# Patient Record
Sex: MALE | Race: WHITE | URBAN - NONMETROPOLITAN AREA
[De-identification: names, ages, dates, MRNs, and addresses within clinical notes are randomized per-mention and may not be internally consistent; named-entity substitution may affect disease eponyms.]

---

## 2021-09-17 ENCOUNTER — HOSPITAL ENCOUNTER (EMERGENCY)
Age: 26
Discharge: HOME | End: 2021-09-17
Attending: EMERGENCY MEDICINE

## 2021-09-17 VITALS
BODY MASS INDEX: 19.7 KG/M2 | HEART RATE: 98 BPM | DIASTOLIC BLOOD PRESSURE: 82 MMHG | HEIGHT: 68 IN | RESPIRATION RATE: 18 BRPM | TEMPERATURE: 99 F | WEIGHT: 130 LBS | OXYGEN SATURATION: 99 % | SYSTOLIC BLOOD PRESSURE: 138 MMHG

## 2021-09-17 DIAGNOSIS — K04.7 DENTAL ABSCESS: Primary | ICD-10-CM

## 2021-09-17 PROCEDURE — 6370000000 HC RX 637 (ALT 250 FOR IP): Performed by: EMERGENCY MEDICINE

## 2021-09-17 PROCEDURE — 99284 EMERGENCY DEPT VISIT MOD MDM: CPT

## 2021-09-17 RX ORDER — PENICILLIN V POTASSIUM 500 MG/1
500 TABLET ORAL 4 TIMES DAILY
Qty: 40 TABLET | Refills: 0 | Status: SHIPPED | OUTPATIENT
Start: 2021-09-17 | End: 2021-09-27

## 2021-09-17 RX ORDER — PENICILLIN V POTASSIUM 250 MG/1
500 TABLET ORAL ONCE
Status: COMPLETED | OUTPATIENT
Start: 2021-09-17 | End: 2021-09-17

## 2021-09-17 RX ADMIN — PENICILLIN V POTASSIUM 500 MG: 250 TABLET, FILM COATED ORAL at 14:27

## 2021-09-17 NOTE — ED NOTES
Discharge instructions and medications reviewed with patient. Patient verbalized understanding of medications and follow up. All questions answered at this time Skin warm, pink, and dry. Patient alert and oriented x4. Pt ambulated to lobby with a stable gait. Patient discharged home with 1 prescriptions.       Crys Nova RN  09/17/21 2174

## 2021-09-17 NOTE — ED PROVIDER NOTES
[facility]      Pt Name: Nazia Anderson  MRN: [id number]  [place] 1995  Date of evaluation: 9/17/2021  Provider: Sofiya Wray MD    [address] [place] [address]       Chief Complaint   Patient presents with    Facial Swelling     right cheek, x1 week          HISTORY OF PRESENT ILLNESS   (Location/Symptom, Timing/Onset, Context/Setting, Quality, Duration, Modifying Factors, Severity)  Note limiting factors. Nazia Anderson is a 32 y.o. male with past medical history of poor dentition with prior dental abscesses here today for concern of the same. Patient states for the last week he has developed slowly worsening swelling to the right upper jaw and cheek as a result of an abscessed tooth. He states it has been spontaneously draining. He had low-grade fevers at home. Notes a throbbing aching discomfort associated with the swelling. No obvious alleviating factors. [facility]    Nursing Notes were reviewed. REVIEW OF SYSTEMS    (2-9 systems for level 4, 10 or more for level 5)     Review of Systems    Please see HPI for pertinent positive and negative review of system findings. A full 10 system ROS was performed and otherwise negative. PAST MEDICAL HISTORY     Past Medical History:   Diagnosis Date    MRSA (methicillin resistant staph aureus) culture positive 12/27/12    abscess buttock         SURGICAL HISTORY     History reviewed. No pertinent surgical history. CURRENT MEDICATIONS       Previous Medications    No medications on file       ALLERGIES     Patient has no known allergies. FAMILY HISTORY     History reviewed. No pertinent family history.        SOCIAL HISTORY       Social History     Socioeconomic History    Marital status: Single     Spouse name: None    Number of children: None    Years of education: None    Highest education level: None   Occupational History    None   Tobacco Use    Smoking status: Current Every Day Smoker Packs/day: 1.00     Types: Cigarettes    Smokeless tobacco: Never Used   Vaping Use    Vaping Use: Never used   Substance and Sexual Activity    Alcohol use: No    Drug use: No    Sexual activity: Yes     Partners: Female   Other Topics Concern    None   Social History Narrative    None     Social Determinants of Health     Financial Resource Strain:     Difficulty of Paying Living Expenses:    Food Insecurity:     Worried About Running Out of Food in the Last Year:     Ran Out of Food in the Last Year:    Transportation Needs:     Lack of Transportation (Medical):  Lack of Transportation (Non-Medical):    Physical Activity:     Days of Exercise per Week:     Minutes of Exercise per Session:    Stress:     Feeling of Stress :    Social Connections:     Frequency of Communication with Friends and Family:     Frequency of Social Gatherings with Friends and Family:     Attends Taoism Services:     Active Member of Clubs or Organizations:     Attends Club or Organization Meetings:     Marital Status:    Intimate Partner Violence:     Fear of Current or Ex-Partner:     Emotionally Abused:     Physically Abused:     Sexually Abused:        SCREENINGS               PHYSICAL EXAM    (up to 7 for level 4, 8 or more for level 5)     ED Triage Vitals [09/17/21 1404]   BP Temp Temp Source Pulse Resp SpO2 Height Weight   (!) 144/106 99 °F (37.2 °C) Oral 111 16 99 % 5' 8\" (1.727 m) 130 lb (59 kg)       Physical Exam      General appearance:  Cooperative. No acute distress. Skin:  Warm. Dry. Ears, nose, mouth and throat:  Oral mucosa moist, soft tissue swelling and mild fullness in the right upper jaw. Oral evaluation notes heavily diseased teeth with poor dentition and erosion of tooth #3, 4 5 where there is associated gum edema and erythema and spontaneously draining purulence. Tongue and uvular midline. Floor of mouth is soft. No involvement of the eye.   No trismus  Perfusion:

## 2022-09-13 ENCOUNTER — HOSPITAL ENCOUNTER (EMERGENCY)
Age: 27
Discharge: HOME | End: 2022-09-13
Attending: STUDENT IN AN ORGANIZED HEALTH CARE EDUCATION/TRAINING PROGRAM

## 2022-09-13 ENCOUNTER — APPOINTMENT (OUTPATIENT)
Dept: CT IMAGING | Age: 27
End: 2022-09-13

## 2022-09-13 VITALS
BODY MASS INDEX: 21.22 KG/M2 | WEIGHT: 140 LBS | DIASTOLIC BLOOD PRESSURE: 88 MMHG | OXYGEN SATURATION: 100 % | SYSTOLIC BLOOD PRESSURE: 139 MMHG | TEMPERATURE: 97.6 F | RESPIRATION RATE: 20 BRPM | HEIGHT: 68 IN | HEART RATE: 84 BPM

## 2022-09-13 DIAGNOSIS — K02.9 DENTAL CARIES: ICD-10-CM

## 2022-09-13 DIAGNOSIS — J02.9 SORE THROAT: Primary | ICD-10-CM

## 2022-09-13 LAB
ANION GAP SERPL CALCULATED.3IONS-SCNC: 9 MMOL/L (ref 3–16)
BASOPHILS ABSOLUTE: 0 K/UL (ref 0–0.2)
BASOPHILS RELATIVE PERCENT: 0.7 %
BUN BLDV-MCNC: 10 MG/DL (ref 7–20)
CALCIUM SERPL-MCNC: 9 MG/DL (ref 8.3–10.6)
CHLORIDE BLD-SCNC: 102 MMOL/L (ref 99–110)
CO2: 24 MMOL/L (ref 21–32)
CREAT SERPL-MCNC: 0.6 MG/DL (ref 0.9–1.3)
EOSINOPHILS ABSOLUTE: 0.2 K/UL (ref 0–0.6)
EOSINOPHILS RELATIVE PERCENT: 3.4 %
GFR AFRICAN AMERICAN: >60
GFR NON-AFRICAN AMERICAN: >60
GLUCOSE BLD-MCNC: 97 MG/DL (ref 70–99)
HCT VFR BLD CALC: 39.4 % (ref 40.5–52.5)
HEMOGLOBIN: 13.1 G/DL (ref 13.5–17.5)
LYMPHOCYTES ABSOLUTE: 2.1 K/UL (ref 1–5.1)
LYMPHOCYTES RELATIVE PERCENT: 30.9 %
MCH RBC QN AUTO: 28.8 PG (ref 26–34)
MCHC RBC AUTO-ENTMCNC: 33.2 G/DL (ref 31–36)
MCV RBC AUTO: 86.9 FL (ref 80–100)
MONOCYTES ABSOLUTE: 0.7 K/UL (ref 0–1.3)
MONOCYTES RELATIVE PERCENT: 9.5 %
NEUTROPHILS ABSOLUTE: 3.8 K/UL (ref 1.7–7.7)
NEUTROPHILS RELATIVE PERCENT: 55.5 %
PDW BLD-RTO: 12.8 % (ref 12.4–15.4)
PLATELET # BLD: 238 K/UL (ref 135–450)
PMV BLD AUTO: 9.7 FL (ref 5–10.5)
POTASSIUM REFLEX MAGNESIUM: 4.3 MMOL/L (ref 3.5–5.1)
RBC # BLD: 4.53 M/UL (ref 4.2–5.9)
S PYO AG THROAT QL: NEGATIVE
SODIUM BLD-SCNC: 135 MMOL/L (ref 136–145)
WBC # BLD: 6.9 K/UL (ref 4–11)

## 2022-09-13 PROCEDURE — 70491 CT SOFT TISSUE NECK W/DYE: CPT

## 2022-09-13 PROCEDURE — 87081 CULTURE SCREEN ONLY: CPT

## 2022-09-13 PROCEDURE — 87077 CULTURE AEROBIC IDENTIFY: CPT

## 2022-09-13 PROCEDURE — 6360000004 HC RX CONTRAST MEDICATION: Performed by: STUDENT IN AN ORGANIZED HEALTH CARE EDUCATION/TRAINING PROGRAM

## 2022-09-13 PROCEDURE — 87880 STREP A ASSAY W/OPTIC: CPT

## 2022-09-13 PROCEDURE — 80048 BASIC METABOLIC PNL TOTAL CA: CPT

## 2022-09-13 PROCEDURE — 99285 EMERGENCY DEPT VISIT HI MDM: CPT

## 2022-09-13 PROCEDURE — 85025 COMPLETE CBC W/AUTO DIFF WBC: CPT

## 2022-09-13 RX ORDER — CHLORHEXIDINE GLUCONATE 0.12 MG/ML
15 RINSE ORAL 3 TIMES DAILY
Qty: 630 ML | Refills: 0 | Status: SHIPPED | OUTPATIENT
Start: 2022-09-13 | End: 2022-09-27

## 2022-09-13 RX ORDER — CLINDAMYCIN HYDROCHLORIDE 300 MG/1
300 CAPSULE ORAL 4 TIMES DAILY
Qty: 28 CAPSULE | Refills: 0 | Status: SHIPPED | OUTPATIENT
Start: 2022-09-13 | End: 2022-09-20

## 2022-09-13 RX ORDER — NAPROXEN 500 MG/1
500 TABLET ORAL 2 TIMES DAILY PRN
Qty: 20 TABLET | Refills: 0 | Status: SHIPPED | OUTPATIENT
Start: 2022-09-13 | End: 2022-09-23

## 2022-09-13 RX ORDER — LIDOCAINE HYDROCHLORIDE 20 MG/ML
15 SOLUTION OROPHARYNGEAL EVERY 4 HOURS PRN
Qty: 100 ML | Refills: 0 | Status: SHIPPED | OUTPATIENT
Start: 2022-09-13 | End: 2022-09-18

## 2022-09-13 RX ADMIN — IOPAMIDOL 75 ML: 755 INJECTION, SOLUTION INTRAVENOUS at 14:54

## 2022-09-13 ASSESSMENT — PAIN SCALES - GENERAL
PAINLEVEL_OUTOF10: 6
PAINLEVEL_OUTOF10: 6

## 2022-09-13 ASSESSMENT — PAIN - FUNCTIONAL ASSESSMENT
PAIN_FUNCTIONAL_ASSESSMENT: 0-10
PAIN_FUNCTIONAL_ASSESSMENT: 0-10

## 2022-09-13 NOTE — ED NOTES
Patient to nurse's station asking for IV to be removed and wanting to leave ER now states \"I am not waiting any longer\" Writer informed patient need to stay for CT neck results and if anything comes abnormal patient will not know if they leave. Patient verbalized understanding. Writer removed patient's IV and gave patient AVS written by Dr. Sarwat Huggins. Discharge instructions of medications and follow-up with PCP and dentist clinics were reviewed with the patient and the patient verbalized understanding. Patient IV was removed with no complications. Patient stable, GCS 15, and ambulatory upon discharge.      Robert Iglesias RN  09/13/22 5748

## 2022-09-13 NOTE — DISCHARGE INSTRUCTIONS
Dental Emergency Referrals    [address] [facility] residents only)    [facility] [place]  [address] [phone]   [facility].  [phone]   [address] [facility]  [address] [facility] [address]  [phone]   [facility] [place]  (entrance on [address] [facility]. [address] [place] [address].)  [address]  [phone]   [facility] 167.  [phone]   [facility]  [address] [place] [address].  [phone] [address] [facility]  [address] [facility].  [phone]   [facility]  [address]  [phone]     [facility] [[email or web address]] MEDICAL [place]  [facility]. 2nd floor  [phone]   [facility]  [address] [phone] ([address] [facility])  [place]: [address] [facility] [address]: [address]  [phone]   [address] [facility]/ [address]  [phone]     [facility]  [address] [facility] [address]  [phone]   [address]  [address] [facility].  Oral Surgery Dept: [phone]  Dental Clinic: [address] [facility]  [phone]     [address] [phone]   Urgent [facility] Care   [address] [place], [address]  [place] : [address] : [phone]     [facility]  [address]    Other [address] in the area    [address] [facility] (Dental Urgent Care)  [facility]  (Across from [place])  [facility], [address]  [phone]?       Pediatric Only Dentists    [address]Third Floor   Up to age 21  [address]  Up to age Carley 74: [facility] [place] on [address]   [phone] or [address] [facility] [address]: [address] [place] [address]  Up to age 14  [address] [phone]

## 2022-09-13 NOTE — ED PROVIDER NOTES
[address] [place] [address]  ED      CHIEF COMPLAINT  Sore throat       HISTORY OF PRESENT ILLNESS  Wild Joy is a 32 y.o. male  who presents to the ED complaining of sore throat. Onset: 3-4d, no inciting event  Quality: aching  Severity: 6/10, constant  Palliative Factors: denies  Provocative Factors: denies  Pain in right teeth, reports pain radiates to his right ear  Teeth sensitivity    Patient is tolerating secretions. They deny muffled voice. They deny difficulty breathing or swallowing. Old records reviewed:  Patient has been seen before for dental abscess    No other complaints, modifying factors or associated symptoms. I have reviewed the following from the nursing documentation. Past Medical History:   Diagnosis Date    MRSA (methicillin resistant staph aureus) culture positive 12/27/12    abscess buttock     History reviewed. No pertinent surgical history. History reviewed. No pertinent family history. Social History     Socioeconomic History    Marital status: Single     Spouse name: Not on file    Number of children: Not on file    Years of education: Not on file    Highest education level: Not on file   Occupational History    Not on file   Tobacco Use    Smoking status: Every Day     Packs/day: 1.00     Types: Cigarettes, E-Cigarettes    Smokeless tobacco: Current   Vaping Use    Vaping Use: Never used   Substance and Sexual Activity    Alcohol use: No    Drug use: No    Sexual activity: Yes     Partners: Female   Other Topics Concern    Not on file   Social History Narrative    Not on file     Social Determinants of Health     Financial Resource Strain: Not on file   Food Insecurity: Not on file   Transportation Needs: Not on file   Physical Activity: Not on file   Stress: Not on file   Social Connections: Not on file   Intimate Partner Violence: Not on file   Housing Stability: Not on file     No current facility-administered medications for this encounter.      Current Outpatient Medications   Medication Sig Dispense Refill    clindamycin (CLEOCIN) 300 MG capsule Take 1 capsule by mouth 4 times daily for 7 days 28 capsule 0    naproxen (NAPROSYN) 500 MG tablet Take 1 tablet by mouth 2 times daily as needed for Pain 20 tablet 0    lidocaine viscous hcl (XYLOCAINE) 2 % SOLN solution Take 15 mLs by mouth every 4 hours as needed for Dental Pain or Pain Take 15 mLs by mouth every 4 hours as needed for Irritation or Pain. You can swish and swallow or gargle 100 mL 0    chlorhexidine (PERIDEX) 0.12 % solution Take 15 mLs by mouth 3 times daily for 14 days 630 mL 0     No Known Allergies    REVIEW OF SYSTEMS  All systems reviewed, pertinent positives per HPI otherwise noted to be negative. PHYSICAL EXAM  BP (!) 156/96   Pulse 83   Temp 97.6 °F (36.4 °C) (Oral)   Resp 20   Ht 5' 8\" (1.727 m)   Wt 140 lb (63.5 kg)   SpO2 98%   BMI 21.29 kg/m²    GENERAL APPEARANCE: Awake and alert. Cooperative. no distress. ENT: Mucous membranes are moist. Tolerates saliva. No trismus. Posterior oropharynx shows no tonsillar hypertrophy or exudates. Uvula is  midline. Submandibular area is soft. No acute facial rash. Multiple dental caries, no evidence of abscess inside of the mouth  NECK: Supple. No meningismus. Trachea midline. Anterior cervical LAD is not present. EYES: PERRL. EOM's grossly intact. Sclera anicteric. HEART/CHEST: RRR. No murmurs. LUNGS: Respirations unlabored. CTAB. Good air exchange. Speaking comfortably in full sentences. ABDOMEN: No tenderness. Soft. Non-distended. No masses. No organomegaly. No guarding or rebound. Splenomegaly is not present. MUSCULOSKELETAL: No extremity edema. Compartments soft. No deformity. No tenderness in the extremities. All extremities neurovascularly intact. SKIN: Warm and dry. No acute rashes. NEUROLOGICAL: Alert and oriented. CN's 2-12 intact. No gross facial drooping. Strength 5/5, sensation intact.    PSYCHIATRIC: Normal mood and affect. LABS  I have reviewed all labs for this visit. Results for orders placed or performed during the hospital encounter of 09/13/22   Strep screen group a throat    Specimen: Throat   Result Value Ref Range    Rapid Strep A Screen Negative Negative   Culture, Beta Strep Confirm Plates    Specimen: Throat   Result Value Ref Range    Strep A Culture Further report to follow    CBC with Auto Differential   Result Value Ref Range    WBC 6.9 4.0 - 11.0 K/uL    RBC 4.53 4.20 - 5.90 M/uL    Hemoglobin 13.1 (L) 13.5 - 17.5 g/dL    Hematocrit 39.4 (L) 40.5 - 52.5 %    MCV 86.9 80.0 - 100.0 fL    MCH 28.8 26.0 - 34.0 pg    MCHC 33.2 31.0 - 36.0 g/dL    RDW 12.8 12.4 - 15.4 %    Platelets 280 462 - 443 K/uL    MPV 9.7 5.0 - 10.5 fL    Neutrophils % 55.5 %    Lymphocytes % 30.9 %    Monocytes % 9.5 %    Eosinophils % 3.4 %    Basophils % 0.7 %    Neutrophils Absolute 3.8 1.7 - 7.7 K/uL    Lymphocytes Absolute 2.1 1.0 - 5.1 K/uL    Monocytes Absolute 0.7 0.0 - 1.3 K/uL    Eosinophils Absolute 0.2 0.0 - 0.6 K/uL    Basophils Absolute 0.0 0.0 - 0.2 K/uL   BMP w/ Reflex to MG   Result Value Ref Range    Sodium 135 (L) 136 - 145 mmol/L    Potassium reflex Magnesium 4.3 3.5 - 5.1 mmol/L    Chloride 102 99 - 110 mmol/L    CO2 24 21 - 32 mmol/L    Anion Gap 9 3 - 16    Glucose 97 70 - 99 mg/dL    BUN 10 7 - 20 mg/dL    Creatinine 0.6 (L) 0.9 - 1.3 mg/dL    GFR Non-African American >60 >60    GFR African American >60 >60    Calcium 9.0 8.3 - 10.6 mg/dL         RADIOLOGY  CT SOFT TISSUE NECK W CONTRAST   Final Result   Marker has been placed over the right submandibular space. Underlying the   marker there are multiple subcentimeter reactive nodes. No signs of tonsillitis, Renate tonsillar or tonsillar abscess collection. No   sialoadenitis. Multiple cavitary lesions of bilateral mandibular molar teeth. No mass lesion, abnormal collection or pathologically enlarged neck   lymphadenopathy.       Mucosal retention cyst of right maxillary sinus resulting in its moderate   opacification. RECOMMENDATIONS:   Unavailable               ED COURSE / MDM  Patient seen and evaluated. Old records reviewed and pertinent information included in HPI. Labs and imaging reviewed and results discussed with patient. Overall well nontoxic appearing patient, in no acute distress, presenting for sore throat. Physical exam remarkable for reassuring exam though patient does report that he feels like his throat is swollen. Differential diagnosis includes but is not limited to: Viral pharyngitis, strep throat, mononucleosis, dental infection, low suspicion for peritonsillar abscess, retropharyngeal abscess, epiglottitis, or Conrado's angina    During the patient's ED course, the patient was given:  Medications   iopamidol (ISOVUE-370) 76 % injection 75 mL (75 mLs IntraVENous Given 9/13/22 1454)      ED Course as of 09/15/22 2051   Tue Sep 13, 2022   1404 No leukocytosis or thrombocytopenia. Mild anemia 13.1 [ER]   1435 Mild hyponatremia 135, no other electrolyte abnormalities or evidence of kidney dysfunction. [ER]   1435 Strep swab negative [ER]      ED Course User Index  [ER] Tammy Skaggs MD     Is this patient to be included in the SEP-1 Core Measure due to severe sepsis or septic shock? No   Exclusion criteria - the patient is NOT to be included for SEP-1 Core Measure due to:  2+ SIRS criteria are not met    On visual examination of the back of the throat, I do not appreciate evidence of peritonsillar abscess. Patient denies muffled voice, patient does not have neck stiffness and is tolerating secretions. There is no stridor on exam.  Low suspicion for retropharyngeal abscess or epiglottitis at this time. Floor of mouth is soft to palpation, no evidence of Conrado's angina. Given that patient was reporting that he felt like his throat was swollen down past the area that could be visualized, CT scan was obtained.   CT scan was mouth 2 times daily as needed for Pain, Disp-20 tablet, R-0Normal      lidocaine viscous hcl (XYLOCAINE) 2 % SOLN solution Take 15 mLs by mouth every 4 hours as needed for Dental Pain or Pain Take 15 mLs by mouth every 4 hours as needed for Irritation or Pain. You can swish and swallow or gargle, Disp-100 mL, R-0Normal      chlorhexidine (PERIDEX) 0.12 % solution Take 15 mLs by mouth 3 times daily for 14 days, Disp-630 mL, R-0Normal             Follow-up with:  Sumeet Bates MD  [address] [facility] [address] [place] [phone]    Schedule an appointment as soon as possible for a visit in 2 days      [facility]  ED  [address]  Go to   As needed, If symptoms worsen    DISCLAIMER: This chart was created using Dragon dictation software. Efforts were made by me to ensure accuracy, however some errors may be present due to limitations of this technology and occasionally words are not transcribed correctly.         Kai Vanessa MD  09/15/22 6271

## 2022-09-13 NOTE — ED NOTES
Pt request to go to in house [facility] to get phone  from girlfriend. Pt request denied. Writer made pt aware that he has an IV in his arm unable to leave ED with IV placed. Pt states \"well can you tell me how much longer I will be here\". Writer states \"as soon as your results CT Scan is back to let us know what's going on with you\". Writer called [facility] for pt to talk to girlfriend.      Ramses Euceda LPN  19/[birth year]74 8619

## 2022-09-15 LAB
ORGANISM: ABNORMAL
S PYO THROAT QL CULT: ABNORMAL
S PYO THROAT QL CULT: ABNORMAL

## 2023-01-01 ENCOUNTER — HOSPITAL ENCOUNTER (EMERGENCY)
Age: 28
Discharge: HOME | End: 2023-01-01
Payer: MEDICAID

## 2023-01-01 VITALS
DIASTOLIC BLOOD PRESSURE: 94 MMHG | OXYGEN SATURATION: 100 % | HEART RATE: 67 BPM | SYSTOLIC BLOOD PRESSURE: 149 MMHG | RESPIRATION RATE: 18 BRPM | TEMPERATURE: 98.1 F

## 2023-01-01 DIAGNOSIS — R03.0 ELEVATED BLOOD PRESSURE READING: ICD-10-CM

## 2023-01-01 DIAGNOSIS — K04.7 DENTAL INFECTION: Primary | ICD-10-CM

## 2023-01-01 DIAGNOSIS — K08.89 ODONTALGIA: ICD-10-CM

## 2023-01-01 PROCEDURE — 6370000000 HC RX 637 (ALT 250 FOR IP): Performed by: PHYSICIAN ASSISTANT

## 2023-01-01 PROCEDURE — 99283 EMERGENCY DEPT VISIT LOW MDM: CPT

## 2023-01-01 RX ORDER — PENICILLIN V POTASSIUM 500 MG/1
500 TABLET ORAL 4 TIMES DAILY
Qty: 40 TABLET | Refills: 0 | Status: SHIPPED | OUTPATIENT
Start: 2023-01-01 | End: 2023-01-11

## 2023-01-01 RX ORDER — PENICILLIN V POTASSIUM 250 MG/1
500 TABLET ORAL ONCE
Status: COMPLETED | OUTPATIENT
Start: 2023-01-01 | End: 2023-01-01

## 2023-01-01 RX ADMIN — PENICILLIN V POTASSIUM 500 MG: 250 TABLET, FILM COATED ORAL at 20:37

## 2023-01-02 NOTE — DISCHARGE INSTRUCTIONS
Toothache    Toothaches are generally caused by tooth decay. If you have severe pain or swelling around a tooth, you may have a deep tooth infection. Tooth decay and infections require evaluation and treatment by a dentist or an oral surgeon. The emergency department will provide you with the best possible care available for your dental problem. Unfortunately, there is not a dentist or dental clinic available in the department. Routine dental care, such as fillings, tooth extractions, or triny canals are not available in the emergency department. However, we are avaialbe for emergencies including abscesses, fractures of the jaw and other oral trauma such as a tooth that is knocked out. Any other dental problem is best treated by a dentist.  Please see the list of dental clinics in the area if you do not currently have a dentist.      Treatment That Can Be Provided for Toothache in the Emergency Department    If you have a severe toothache, medication may be prescribed for you until you are able to see a dentist.  If you are given an antibiotic, take it as prescribed and continue to take it until gone. Even if you start to feel better, your toothache will need to be treated by a dentist or an oral surgeon. Pain medication may be prescribed for you. As is the policy of the [facility] Department, the emergency department uses nonsteroidal anti-inflammatory medication (NSAIDs) as the primary medication for pain. These may include ibuprofen (Motrin®) or naproxyn sodium (Naprosyn®). Patients who are unable to take nonsteroidal anti-inflammatory medications will generally be advised to take acetaminophen (Tylenol®) for dental pain. As is the policy of the [address] [place] [address], the hospitals emergency department policy strongly discourages the use of the narcotic medications. (Tylenol #3®, Vicodin®, etc) are restricted by specific, strict guidelines.     If you have any questions concerning these instructions, call the emergency department at [facility] @ [phone]. Consult your care giver regarding these instructions and seek your physicians advice regarding medical care. Dental Referrals  Following is a list of local clinics that treat dental problems. Many also have specific emergency hours. For an appointment or for hours of operation, contact the clinic. [address]  [phone]     [facility] [place]  [address] Jolene Pringle 37    [facility] (referral agency)  [address] [facility], [address] [facility] [address]  [phone] or [phone]  (Application Process, Must Qualify)     [facility]  [address] [place] [address]. [address]  [phone]  ([facility] Medicaid and Insurance with Payment plans for Self-Insured)     Clinics:    [facility]   [address]  [facility], [address]  4200 [facility]  [address]  [place] [address]. [place], [address] [facility]  6 [facility], [facility]5 [address]:    [address] Carmelita Grossman  Appointment only  [address]. [facility], [address] [facility]  Hawa-Vietnamese speaking  [address]  [address]  [place], 201 [facility]  1924 [facility]  [address] [facility] [address]  Rosa Isela Glover [facility] 119  [facility] 4777  [address]  [place]  [facility] 58  [facility] 167.   [place], [address]  4455  [facility]  [address], [place] [address]  [address] 61  [address]  [place], 50 [place],6Th Floor     [facility]   [address]  [place], 1648 Zander Iyer  [phone] ext. [address] 19  10 [address] [facility], 1100 [facility]  [phone] or [phone]  (Must qualify)       Your blood pressure is elevated and will need to be monitored. Have this rechecked at your doctor's office or make an carrie w/ the doctor you were referred to on your discharge instructions to have your blood pressure rechecked sometime within the next 7 days.  Untreated elevated blood pressure puts you at risk for stroke, heart attack, kidney disease and other serious medical conditions

## 2023-01-02 NOTE — ED PROVIDER NOTES
[address] [facility]        Pt Name: Ulysses Shoe  MRN: [id number]  [place] 1995  Date of evaluation: 1/1/2023  Provider: Tamara Calderon PA-C  PCP: Ari Warner MD  Note Started: 8:38 PM EST 1/1/23      MEHRAN. I have evaluated this patient. My supervising physician was available for consultation. CHIEF COMPLAINT       Chief Complaint   Patient presents with    Dental Pain     Pt states that he believes that he has a dental abscess in the left lower jaw and needs abx. HISTORY OF PRESENT ILLNESS: 1 or more Elements     History from : Patient    Limitations to history : None    Ulysses Shoe is a 32 y.o. male who presents to the emergency department for evaluation of left lower dental pain for the past 1 week. History of dental abscess and believes he is developing another abscess. Has swelling to the left lower jaw. No fever. No vomiting. No difficulty swallowing. No dentist.    Nursing Notes were all reviewed and agreed with or any disagreements were addressed in the HPI. REVIEW OF SYSTEMS :      Review of Systems    Positives and Pertinent negatives as per HPI. SURGICAL HISTORY   No past surgical history on file. [address]       Discharge Medication List as of 1/1/2023  8:34 PM        CONTINUE these medications which have NOT CHANGED    Details   naproxen (NAPROSYN) 500 MG tablet Take 1 tablet by mouth 2 times daily as needed for Pain, Disp-20 tablet, R-0Normal             ALLERGIES     Patient has no known allergies. FAMILYHISTORY     No family history on file.      SOCIAL HISTORY       Social History     Tobacco Use    Smoking status: Every Day     Packs/day: 1.00     Types: Cigarettes, E-Cigarettes    Smokeless tobacco: Current   Vaping Use    Vaping Use: Never used   Substance Use Topics    Alcohol use: No    Drug use: No       SCREENINGS        [place] Coma Scale  Eye Opening: Spontaneous  Best Verbal Response: Oriented  Best Motor Response: Obeys commands  [place] Coma Scale Score: 15                CIWA Assessment  BP: (!) 149/94  Heart Rate: 67           PHYSICAL EXAM  1 or more Elements     ED Triage Vitals [01/01/23 2005]   BP Temp Temp Source Heart Rate Resp SpO2 Height Weight   (!) 149/94 98.1 °F (36.7 °C) Oral 67 18 100 % -- --       Physical Exam    Patient sitting up in the bed alert and oriented x4. Heart rhythm rate and rhythm. Lungs are clear to auscultation bilaterally no wheezes rales or rhonchi no stridor. Oropharynx is clear. TMs clear. Tenderness to palpation left lower first molar with mild left lower gum swelling and mild swelling left lower jaw. No tenting abscess. No drainage. No trismus. No sublingual, submental or submandibular swelling. Neck is supple. DIAGNOSTIC RESULTS   LABS:    Labs Reviewed - No data to display    When ordered only abnormal lab results are displayed. All other labs were within normal range or not returned as of this dictation. EKG: When ordered, EKG's are interpreted by the Emergency Department Physician in the absence of a cardiologist.  Please see their note for interpretation of EKG. RADIOLOGY:   Non-plain film images such as CT, Ultrasound and MRI are read by the radiologist. Plain radiographic images are visualized and preliminarily interpreted by the ED Provider with the below findings:        Interpretation per the Radiologist below, if available at the time of this note:    No orders to display     No results found. No results found. PROCEDURES   Unless otherwise noted below, none     Procedures    CRITICAL CARE TIME (.cctime)   0    PAST MEDICAL HISTORY      has a past medical history of MRSA (methicillin resistant staph aureus) culture positive (12/27/12).      Chronic Conditions affecting Care:     EMERGENCY DEPARTMENT COURSE and DIFFERENTIAL DIAGNOSIS/MDM:   Vitals:    Vitals:    01/01/23 2005   BP: (!) 149/94   Pulse: 67   Resp: 18   Temp: 98.1 °F (36.7 °C)   TempSrc: Oral   SpO2: 100%       Patient was given the following medications:  Medications   penicillin v potassium (VEETID) tablet 500 mg (500 mg Oral Given 1/1/23 2037)             Is this patient to be included in the SEP-1 Core Measure due to severe sepsis or septic shock? No   Exclusion criteria - the patient is NOT to be included for SEP-1 Core Measure due to:  2+ SIRS criteria are not met    CONSULTS: (Who and What was discussed)  None  Discussion with Other Profesionals : None        CC/HPI Summary, DDx, ED Course, and Reassessment:   Patient presented to the ER evaluation of left lower dental pain for the past 1 week. On exam he has tenderness to palpation left lower first molar with mild swelling left lower gums and left lower jaw swelling. No signs of ludwigs, deep space infection or airway compromise. He was started on penicillin  mg tablets 4 times a day for 10 days and he will take Tylenol and ibuprofen for pain. Provided dental clinic list advised to see a dentist as soon as possible and to return for any worsening symptoms specifically returning for worsening pain swelling if he develops fevers vomiting or any difficulty swallowing. He understands and agrees. I estimate there is LOW risk for a DEEP SPACE INFECTION (e.g., REJIS ANGINA OR RETROPHARYNGEAL ABSCESS), MENINGITIS, or AIRWAY COMPROMISE, thus I consider the discharge disposition reasonable. Also, there is no evidence or peritonitis, sepsis, or toxicity. I am the Primary Clinician of Record. FINAL IMPRESSION      1. Dental infection    2. Odontalgia    3. Elevated blood pressure reading          DISPOSITION/PLAN     DISPOSITION Decision to Discharge    PATIENT REFERRED TO:  Dentist    Schedule an appointment as soon as possible for a visit       Jose D Hernandez.  [facility] Emergency Department  [address]    If symptoms worsen      DISCHARGE MEDICATIONS:  Discharge Medication List as of 1/1/2023  8:34 PM        START taking these medications    Details   penicillin v potassium (VEETID) 500 MG tablet Take 1 tablet by mouth 4 times daily for 10 days, Disp-40 tablet, R-0Normal             DISCONTINUED MEDICATIONS:  Discharge Medication List as of 1/1/2023  8:34 PM                 (Please note that portions of this note were completed with a voice recognition program.  Efforts were made to edit the dictations but occasionally words are mis-transcribed.)    Susannah Fleming PA-C (electronically signed)           Brandon Krishna PA-C  01/01/23 2042

## 2023-12-30 ENCOUNTER — HOSPITAL ENCOUNTER (EMERGENCY)
Age: 28
Discharge: HOME | End: 2023-12-30
Attending: EMERGENCY MEDICINE
Payer: COMMERCIAL

## 2023-12-30 VITALS
SYSTOLIC BLOOD PRESSURE: 157 MMHG | TEMPERATURE: 98.8 F | RESPIRATION RATE: 16 BRPM | BODY MASS INDEX: 34.07 KG/M2 | HEART RATE: 81 BPM | OXYGEN SATURATION: 95 % | WEIGHT: 230 LBS | HEIGHT: 69 IN | DIASTOLIC BLOOD PRESSURE: 95 MMHG

## 2023-12-30 DIAGNOSIS — U07.1 COVID: Primary | ICD-10-CM

## 2023-12-30 LAB
FLUAV RNA UPPER RESP QL NAA+PROBE: NEGATIVE
FLUBV AG NPH QL: NEGATIVE
SARS-COV-2 RDRP RESP QL NAA+PROBE: DETECTED

## 2023-12-30 PROCEDURE — 6370000000 HC RX 637 (ALT 250 FOR IP): Performed by: EMERGENCY MEDICINE

## 2023-12-30 PROCEDURE — 87804 INFLUENZA ASSAY W/OPTIC: CPT

## 2023-12-30 PROCEDURE — 99283 EMERGENCY DEPT VISIT LOW MDM: CPT

## 2023-12-30 PROCEDURE — 87635 SARS-COV-2 COVID-19 AMP PRB: CPT

## 2023-12-30 RX ORDER — PSEUDOEPHEDRINE HCL 30 MG
60 TABLET ORAL ONCE
Status: DISCONTINUED | OUTPATIENT
Start: 2023-12-30 | End: 2023-12-30 | Stop reason: HOSPADM

## 2023-12-30 RX ORDER — BUPRENORPHINE 8 MG/1
16 TABLET SUBLINGUAL DAILY
COMMUNITY

## 2023-12-30 RX ORDER — PSEUDOEPHEDRINE HCL 30 MG
30 TABLET ORAL EVERY 6 HOURS PRN
Qty: 20 TABLET | Refills: 1 | Status: SHIPPED | OUTPATIENT
Start: 2023-12-30 | End: 2024-12-29

## 2023-12-30 RX ORDER — IBUPROFEN 400 MG/1
800 TABLET ORAL ONCE
Status: COMPLETED | OUTPATIENT
Start: 2023-12-30 | End: 2023-12-30

## 2023-12-30 RX ORDER — ONDANSETRON 4 MG/1
4 TABLET, ORALLY DISINTEGRATING ORAL 3 TIMES DAILY PRN
Qty: 21 TABLET | Refills: 0 | Status: SHIPPED | OUTPATIENT
Start: 2023-12-30

## 2023-12-30 RX ORDER — ONDANSETRON 4 MG/1
4 TABLET, ORALLY DISINTEGRATING ORAL ONCE
Status: COMPLETED | OUTPATIENT
Start: 2023-12-30 | End: 2023-12-30

## 2023-12-30 RX ADMIN — ONDANSETRON 4 MG: 4 TABLET, ORALLY DISINTEGRATING ORAL at 18:08

## 2023-12-30 RX ADMIN — IBUPROFEN 800 MG: 400 TABLET, FILM COATED ORAL at 18:08

## 2023-12-30 ASSESSMENT — PAIN - FUNCTIONAL ASSESSMENT: PAIN_FUNCTIONAL_ASSESSMENT: NONE - DENIES PAIN

## 2023-12-30 NOTE — ED NOTES
Pt discharge instructions, follow up and rx x3 reviewed with pt. Pt verbalized understanding. No further needs. Pt discharged at this time.

## 2024-06-11 ENCOUNTER — HOSPITAL ENCOUNTER (EMERGENCY)
Age: 29
Discharge: HOME | End: 2024-06-11
Payer: COMMERCIAL

## 2024-06-11 VITALS
SYSTOLIC BLOOD PRESSURE: 158 MMHG | TEMPERATURE: 98.2 F | DIASTOLIC BLOOD PRESSURE: 90 MMHG | BODY MASS INDEX: 32.58 KG/M2 | OXYGEN SATURATION: 100 % | HEIGHT: 68 IN | HEART RATE: 61 BPM | WEIGHT: 215 LBS | RESPIRATION RATE: 18 BRPM

## 2024-06-11 DIAGNOSIS — K02.9 DENTAL CARIES: ICD-10-CM

## 2024-06-11 DIAGNOSIS — K08.89 ODONTALGIA: Primary | ICD-10-CM

## 2024-06-11 PROCEDURE — 99283 EMERGENCY DEPT VISIT LOW MDM: CPT

## 2024-06-11 PROCEDURE — 6370000000 HC RX 637 (ALT 250 FOR IP): Performed by: PHYSICIAN ASSISTANT

## 2024-06-11 RX ORDER — IBUPROFEN 400 MG/1
800 TABLET ORAL ONCE
Status: COMPLETED | OUTPATIENT
Start: 2024-06-11 | End: 2024-06-11

## 2024-06-11 RX ORDER — IBUPROFEN 800 MG/1
800 TABLET ORAL EVERY 8 HOURS PRN
Qty: 20 TABLET | Refills: 0 | Status: SHIPPED | OUTPATIENT
Start: 2024-06-11

## 2024-06-11 RX ORDER — PENICILLIN V POTASSIUM 500 MG/1
500 TABLET ORAL 4 TIMES DAILY
Qty: 40 TABLET | Refills: 0 | Status: SHIPPED | OUTPATIENT
Start: 2024-06-11 | End: 2024-06-21

## 2024-06-11 RX ORDER — PENICILLIN V POTASSIUM 250 MG/1
500 TABLET ORAL ONCE
Status: COMPLETED | OUTPATIENT
Start: 2024-06-11 | End: 2024-06-11

## 2024-06-11 RX ADMIN — IBUPROFEN 800 MG: 400 TABLET, FILM COATED ORAL at 17:22

## 2024-06-11 RX ADMIN — PENICILLIN V POTASSIUM 500 MG: 250 TABLET, FILM COATED ORAL at 17:22

## 2024-06-11 ASSESSMENT — PAIN DESCRIPTION - ORIENTATION: ORIENTATION: RIGHT

## 2024-06-11 ASSESSMENT — LIFESTYLE VARIABLES
HOW MANY STANDARD DRINKS CONTAINING ALCOHOL DO YOU HAVE ON A TYPICAL DAY: PATIENT DOES NOT DRINK
HOW OFTEN DO YOU HAVE A DRINK CONTAINING ALCOHOL: NEVER

## 2024-06-11 ASSESSMENT — PAIN SCALES - GENERAL: PAINLEVEL_OUTOF10: 2

## 2024-06-11 ASSESSMENT — PAIN DESCRIPTION - LOCATION: LOCATION: JAW

## 2024-06-11 NOTE — ED PROVIDER NOTES
Motor: No abnormal muscle tone.   Psychiatric:         Behavior: Behavior normal.           DIAGNOSTIC RESULTS   LABS:    Labs Reviewed - No data to display    When ordered only abnormal lab results are displayed. All other labs were within normal range or not returned as of this dictation.    EKG: When ordered, EKG's are interpreted by the Emergency Department Physician in the absence of a cardiologist.  Please see their note for interpretation of EKG.    RADIOLOGY:   Non-plain film images such as CT, Ultrasound and MRI are read by the radiologist. Plain radiographic images are visualized and preliminarily interpreted by the ED Provider with the below findings:        Interpretation per the Radiologist below, if available at the time of this note:    No orders to display     No results found.    No results found.    PROCEDURES   Unless otherwise noted below, none     Procedures    CRITICAL CARE TIME (.cctime)   0    PAST MEDICAL HISTORY      has a past medical history of MRSA (methicillin resistant staph aureus) culture positive (12/27/12).     EMERGENCY DEPARTMENT COURSE and DIFFERENTIAL DIAGNOSIS/MDM:   Vitals:    Vitals:    06/11/24 1647   BP: (!) 158/90   Pulse: 61   Resp: 18   Temp: 98.2 °F (36.8 °C)   TempSrc: Oral   SpO2: 100%   Weight: 97.5 kg (215 lb)   Height: 1.727 m (5' 8\")       Patient was given the following medications:  Medications   penicillin v potassium (VEETID) tablet 500 mg (has no administration in time range)   ibuprofen (ADVIL;MOTRIN) tablet 800 mg (has no administration in time range)             Is this patient to be included in the SEP-1 Core Measure due to severe sepsis or septic shock?   No   Exclusion criteria - the patient is NOT to be included for SEP-1 Core Measure due to:      Chronic Conditions affecting care:    has a past medical history of MRSA (methicillin resistant staph aureus) culture positive (12/27/12).    CONSULTS: (Who and What was discussed)  None        Records

## 2024-06-11 NOTE — DISCHARGE INSTRUCTIONS
Penicillin-antibiotic prescribed.  Tylenol and ibuprofen as needed for pain.  Dental clinic list provided.  Recommend follow-up with dentist soon as possible.  Return to the ER for any worsening symptoms specifically for increased pain, increased swelling or trouble swallowing.      Toothache    Toothaches are generally caused by tooth decay.  If you have severe pain or swelling around a tooth, you may have a deep tooth infection.  Tooth decay and infections require evaluation and treatment by a dentist or an oral surgeon.    The emergency department will provide you with the best possible care available for your dental problem.  Unfortunately, there is not a dentist or dental clinic available in the department.  Routine dental care, such as fillings, tooth extractions, or triny canals are not available in the emergency department.  However, we are avaialbe for emergencies including abscesses, fractures of the jaw and other oral trauma such as a tooth that is knocked out.  Any other dental problem is best treated by a dentist.  Please see the list of dental clinics in the area if you do not currently have a dentist.      Treatment That Can Be Provided for Toothache in the Emergency Department    If you have a severe toothache, medication may be prescribed for you until you are able to see a dentist.  If you are given an antibiotic, take it as prescribed and continue to take it until gone.  Even if you start to feel better, your toothache will need to be treated by a dentist or an oral surgeon.    Pain medication may be prescribed for you.  As is the policy of the [facility], the emergency department uses nonsteroidal anti-inflammatory medication (NSAIDs) as the primary medication for pain.  These may include ibuprofen (Motrin®) or naproxyn sodium (Naprosyn®).    Patients who are unable to take nonsteroidal anti-inflammatory medications will generally be advised to take acetaminophen (Tylenol®) for